# Patient Record
Sex: FEMALE | Race: WHITE | NOT HISPANIC OR LATINO | URBAN - METROPOLITAN AREA
[De-identification: names, ages, dates, MRNs, and addresses within clinical notes are randomized per-mention and may not be internally consistent; named-entity substitution may affect disease eponyms.]

---

## 2022-05-20 ENCOUNTER — APPOINTMENT (RX ONLY)
Dept: URBAN - METROPOLITAN AREA CLINIC 173 | Facility: CLINIC | Age: 27
Setting detail: DERMATOLOGY
End: 2022-05-20

## 2022-05-20 DIAGNOSIS — Z41.9 ENCOUNTER FOR PROCEDURE FOR PURPOSES OTHER THAN REMEDYING HEALTH STATE, UNSPECIFIED: ICD-10-CM

## 2022-05-20 PROCEDURE — ? DIAGNOSIS COMMENT

## 2022-05-20 PROCEDURE — ? TREATMENT REGIMEN

## 2022-05-20 PROCEDURE — ? PRESCRIPTION

## 2022-05-20 PROCEDURE — ? PULSED-DYE LASER

## 2022-05-20 RX ORDER — PHARMACY COMPOUNDING ACCESSORY
EACH MISCELLANEOUS
Qty: 30 | Refills: 4 | Status: ERX | COMMUNITY
Start: 2022-05-20

## 2022-05-20 RX ADMIN — Medication: at 00:00

## 2022-05-20 NOTE — PROCEDURE: PULSED-DYE LASER
Pulse Duration: 6 ms
Pulse Duration: 10 ms
Spot Size: 10 mm
Delay Time (Ms): 20
Spot Size: 7 mm
Immediate Endpoint: erythema
Delay Time (Ms): 20
Fluence In J/Cm2 (Optional): 7.75
Cryogen Time (Ms): 30
Treated Area: medium area
Post-Care Instructions: I reviewed with the patient in detail post-care instructions. Patient should stay away from the sun and wear sun protection until treated areas are fully healed.
Cryogen Time (Ms): 30
Laser Type: Vbeam 595nm
Post-Procedure Care: Ice applied after treatment. Post care reviewed with patient.
Consent: Written consent obtained, risks reviewed including but not limited to crusting, scabbing, blistering, scarring, darker or lighter pigmentary change, incidental hair removal, bruising, and/or incomplete removal.
Fluence In J/Cm2 (Optional): 7.50
Pulse Count (Optional): 251
Price (Use Numbers Only, No Special Characters Or $): 400
Detail Level: Zone
Location Override: abdomen

## 2022-05-20 NOTE — PROCEDURE: TREATMENT REGIMEN
Initiate Treatment: - Start tretinoin 0.025% gel, 2% glycerin W06 base, 30gram tube\\n- Information about [facility] given to patient
Detail Level: Zone

## 2022-05-20 NOTE — PROCEDURE: DIAGNOSIS COMMENT
Comment: Red stretch marks after pregnancy.  Discussed PDL can help with color but that multiple treatments needed (3-4 at least).  Also start tretinoin to abdomen.  Patient is not breastfeeding.
Detail Level: Simple
Render Risk Assessment In Note?: no

## 2022-06-27 ENCOUNTER — APPOINTMENT (RX ONLY)
Dept: URBAN - METROPOLITAN AREA CLINIC 173 | Facility: CLINIC | Age: 27
Setting detail: DERMATOLOGY
End: 2022-06-27

## 2022-06-27 DIAGNOSIS — Z41.9 ENCOUNTER FOR PROCEDURE FOR PURPOSES OTHER THAN REMEDYING HEALTH STATE, UNSPECIFIED: ICD-10-CM

## 2022-06-27 PROCEDURE — ? DIAGNOSIS COMMENT

## 2022-06-27 PROCEDURE — ? PULSED-DYE LASER

## 2022-06-27 NOTE — PROCEDURE: DIAGNOSIS COMMENT
Comment: Red stretch marks after pregnancy.  Some improvement after 1 session. Discussed PDL can help with color but that multiple treatments needed (3-4 at least).  Continue tretinoin.  Patient is not breastfeeding.
Detail Level: Simple
Render Risk Assessment In Note?: no

## 2022-06-27 NOTE — PROCEDURE: PULSED-DYE LASER
Pulse Duration: 6 ms
Pulse Duration: 10 ms
Spot Size: 7 mm
Delay Time (Ms): 20
Immediate Endpoint: erythema
Delay Time (Ms): 20
Fluence In J/Cm2 (Optional): 7.75
Cryogen Time (Ms): 30
Treated Area: medium area
Post-Care Instructions: I reviewed with the patient in detail post-care instructions. Patient should stay away from the sun and wear sun protection until treated areas are fully healed.
Cryogen Time (Ms): 30
Laser Type: Vbeam 595nm
Post-Procedure Care: Ice applied after treatment. Post care reviewed with patient.
Consent: Written consent obtained, risks reviewed including but not limited to crusting, scabbing, blistering, scarring, darker or lighter pigmentary change, incidental hair removal, bruising, and/or incomplete removal.
Fluence In J/Cm2 (Optional): 7.50
Pulse Count (Optional): 186
Price (Use Numbers Only, No Special Characters Or $): 239
Detail Level: Zone
Spot Size: 10 mm
Location Override: abdomen

## 2022-08-11 ENCOUNTER — APPOINTMENT (RX ONLY)
Dept: URBAN - METROPOLITAN AREA CLINIC 173 | Facility: CLINIC | Age: 27
Setting detail: DERMATOLOGY
End: 2022-08-11

## 2022-08-11 DIAGNOSIS — Z41.9 ENCOUNTER FOR PROCEDURE FOR PURPOSES OTHER THAN REMEDYING HEALTH STATE, UNSPECIFIED: ICD-10-CM

## 2022-08-11 PROCEDURE — ? PULSED-DYE LASER

## 2022-08-11 PROCEDURE — ? DIAGNOSIS COMMENT

## 2022-08-11 ASSESSMENT — LOCATION SIMPLE DESCRIPTION DERM: LOCATION SIMPLE: ABDOMEN

## 2022-08-11 ASSESSMENT — LOCATION DETAILED DESCRIPTION DERM: LOCATION DETAILED: PERIUMBILICAL SKIN

## 2022-08-11 ASSESSMENT — LOCATION ZONE DERM: LOCATION ZONE: TRUNK

## 2022-08-11 NOTE — PROCEDURE: PULSED-DYE LASER
Price (Use Numbers Only, No Special Characters Or $): 765
Delay Time (Ms): 20
Delay Time (Ms): 20
Detail Level: Zone
Spot Size: 7 mm
Pulse Count (Optional): 130
Post-Care Instructions: I reviewed with the patient in detail post-care instructions. Patient should stay away from the sun and wear sun protection until treated areas are fully healed.
Spot Size: 10 mm
Fluence In J/Cm2 (Optional): 7.00
Immediate Endpoint: erythema
Pulse Duration: 10 ms
Pulse Duration: 6 ms
Cryogen Time (Ms): 30
Cryogen Time (Ms): 30
Laser Type: Vbeam 595nm
Fluence In J/Cm2 (Optional): 7.75
Consent: Written consent obtained, risks reviewed including but not limited to crusting, scabbing, blistering, scarring, darker or lighter pigmentary change, incidental hair removal, bruising, and/or incomplete removal.
Post-Procedure Care: Ice applied after treatment. Post care reviewed with patient.
Pulse Duration: 3 ms
Treated Area: large area
Location (Required For Details To Render In Note But Body Touch Will Also Count For First Location): lower abdomen